# Patient Record
Sex: FEMALE | Race: WHITE | NOT HISPANIC OR LATINO | Employment: UNEMPLOYED | URBAN - METROPOLITAN AREA
[De-identification: names, ages, dates, MRNs, and addresses within clinical notes are randomized per-mention and may not be internally consistent; named-entity substitution may affect disease eponyms.]

---

## 2017-08-21 ENCOUNTER — ALLSCRIPTS OFFICE VISIT (OUTPATIENT)
Dept: OTHER | Facility: OTHER | Age: 37
End: 2017-08-21

## 2018-01-13 VITALS
RESPIRATION RATE: 20 BRPM | WEIGHT: 163.8 LBS | DIASTOLIC BLOOD PRESSURE: 66 MMHG | HEIGHT: 63 IN | HEART RATE: 94 BPM | TEMPERATURE: 98.9 F | SYSTOLIC BLOOD PRESSURE: 104 MMHG | BODY MASS INDEX: 29.02 KG/M2 | OXYGEN SATURATION: 98 %

## 2018-06-27 ENCOUNTER — APPOINTMENT (OUTPATIENT)
Dept: RADIOLOGY | Facility: CLINIC | Age: 38
End: 2018-06-27
Attending: FAMILY MEDICINE
Payer: COMMERCIAL

## 2018-06-27 ENCOUNTER — OFFICE VISIT (OUTPATIENT)
Dept: URGENT CARE | Facility: CLINIC | Age: 38
End: 2018-06-27
Payer: COMMERCIAL

## 2018-06-27 VITALS
HEART RATE: 92 BPM | OXYGEN SATURATION: 100 % | SYSTOLIC BLOOD PRESSURE: 126 MMHG | WEIGHT: 167 LBS | HEIGHT: 63 IN | DIASTOLIC BLOOD PRESSURE: 80 MMHG | RESPIRATION RATE: 16 BRPM | TEMPERATURE: 97.8 F | BODY MASS INDEX: 29.59 KG/M2

## 2018-06-27 DIAGNOSIS — M25.562 LEFT KNEE PAIN, UNSPECIFIED CHRONICITY: Primary | ICD-10-CM

## 2018-06-27 DIAGNOSIS — M25.562 LEFT KNEE PAIN, UNSPECIFIED CHRONICITY: ICD-10-CM

## 2018-06-27 PROCEDURE — 73564 X-RAY EXAM KNEE 4 OR MORE: CPT

## 2018-06-27 PROCEDURE — 99213 OFFICE O/P EST LOW 20 MIN: CPT | Performed by: FAMILY MEDICINE

## 2018-06-27 RX ORDER — ALBUTEROL SULFATE 90 UG/1
2 AEROSOL, METERED RESPIRATORY (INHALATION) EVERY 6 HOURS PRN
COMMUNITY

## 2018-06-27 NOTE — PROGRESS NOTES
[address]        NAME: Kristian Sahu is a 34 y o  female  : 1980    MRN: [id number]  DATE: 2018  TIME: 1:18 PM    Assessment and Plan   Left knee pain, unspecified chronicity [M25 562]  1  Left knee pain, unspecified chronicity  XR knee 4+ vw left injury    Ambulatory referral to Orthopedic Surgery         Patient Instructions     Patient Instructions   Left knee pain which may be related to a soft tissue injury  Xray shows no acute osseous abnormalities  We have applied an ace wrap for comfort and support  I have instructed the patient to rest the leg, keep it elevated, apply ice to the site, and take Tylenol or Motrin as needed for pain  I have provided a referral to orthopedics and instructed to follow up for further evaluation and treatment, including an MRI if needed for additional imaging  Follow up with PCP in 3-5 days  Proceed to  ER if symptoms worsen  Chief Complaint     Chief Complaint   Patient presents with    Knee Pain     Pt here for left knee x 1 month, pt states no injury for sure, she did twist it a few weeks ago  Pt states the area is puffy, 7/10  Pt used Advil which didn't help  History of Present Illness       35 yo female presents c/o L knee pain  She states she twisted her knee a few weeks ago and since then she has been experiencing intermittent pain and swelling of the left knee  She has pain w/ walking and going up and down the stairs  No falls or direct trauma to the knee  She denies any bruising, redness, or warmth  No fever/chills  No cuts/open wounds  No numbness/tingling or weakness of the leg  She has been applying ice to the knee and taking Advil as needed for pain  Review of Systems   Review of Systems   Constitutional: Negative  Respiratory: Negative  Cardiovascular: Negative  Musculoskeletal:        As noted in HPI   Skin: Negative  Neurological: Negative            Current Medications       Current Outpatient Prescriptions:     albuterol (PROVENTIL HFA,VENTOLIN HFA) 90 mcg/act inhaler, Inhale 2 puffs every 6 (six) hours as needed for wheezing, Disp: , Rfl:     Current Allergies     Allergies as of 2018 - Reviewed 2018   Allergen Reaction Noted    Other Rash 2018            The following portions of the patient's history were reviewed and updated as appropriate: allergies, current medications, past family history, past medical history, past social history, past surgical history and problem list      Past Medical History:   Diagnosis Date    Allergic     Arthritis     Asthma     Factor V Leiden ([address] )        Past Surgical History:   Procedure Laterality Date     SECTION      x2    DILATION AND CURETTAGE OF UTERUS      FOOT SURGERY      x2    HERNIA REPAIR      NASAL SEPTUM SURGERY      PLANTAR FASCIA SURGERY      x2       Family History   Problem Relation Age of Onset    Factor V Leiden deficiency Mother     Heart disease Father          Medications have been verified  Objective   /80 (BP Location: Right arm, Patient Position: Sitting, Cuff Size: Standard)   Pulse 92   Temp 97 8 °F (36 6 °C) (Tympanic)   Resp 16   Ht 5' 3" (1 6 m)   Wt 75 8 kg (167 lb)   SpO2 100%   BMI 29 58 kg/m²        Physical Exam     Physical Exam   Constitutional: She is oriented to person, place, and time  Vital signs are normal  She appears well-developed and well-nourished  She is active and cooperative  Non-toxic appearance  She does not have a sickly appearance  She does not appear ill  No distress  Musculoskeletal:   Left knee: mild generalized swelling, no erythema, or bruising  No excess warmth  Mild generalized tenderness to palpation  Knee w/ full ROM, c/o pain and difficulty w/ flexion  Negative Lachman's test  Normal gait  Neurological: She is alert and oriented to person, place, and time  Skin: Skin is warm, dry and intact  No bruising, no ecchymosis and no rash noted  She is not diaphoretic  No erythema  Psychiatric: She has a normal mood and affect  Her behavior is normal  Judgment and thought content normal    Nursing note and vitals reviewed

## 2018-06-27 NOTE — PATIENT INSTRUCTIONS
Left knee pain which may be related to a soft tissue injury  Xray shows no acute osseous abnormalities  We have applied an ace wrap for comfort and support  I have instructed the patient to rest the leg, keep it elevated, apply ice to the site, and take Tylenol or Motrin as needed for pain  I have provided a referral to orthopedics and instructed to follow up for further evaluation and treatment, including an MRI if needed for additional imaging

## 2018-06-28 ENCOUNTER — OFFICE VISIT (OUTPATIENT)
Dept: OBGYN CLINIC | Facility: CLINIC | Age: 38
End: 2018-06-28
Payer: COMMERCIAL

## 2018-06-28 VITALS
DIASTOLIC BLOOD PRESSURE: 82 MMHG | SYSTOLIC BLOOD PRESSURE: 124 MMHG | BODY MASS INDEX: 29.59 KG/M2 | HEIGHT: 63 IN | WEIGHT: 167 LBS | HEART RATE: 66 BPM

## 2018-06-28 DIAGNOSIS — M89.9 BONE LESION: ICD-10-CM

## 2018-06-28 DIAGNOSIS — M17.12 PRIMARY LOCALIZED OSTEOARTHRITIS OF LEFT KNEE: ICD-10-CM

## 2018-06-28 DIAGNOSIS — M25.562 LEFT KNEE PAIN, UNSPECIFIED CHRONICITY: Primary | ICD-10-CM

## 2018-06-28 PROCEDURE — 99204 OFFICE O/P NEW MOD 45 MIN: CPT | Performed by: ORTHOPAEDIC SURGERY

## 2018-06-28 RX ORDER — FLUTICASONE PROPIONATE 50 MCG
1 SPRAY, SUSPENSION (ML) NASAL DAILY
COMMUNITY

## 2018-06-28 RX ORDER — MONTELUKAST SODIUM 10 MG/1
10 TABLET ORAL
COMMUNITY

## 2018-06-28 NOTE — PROGRESS NOTES
Assessment/Plan:  1  Left knee pain, unspecified chronicity  MRI knee left  wo contrast   2  Primary localized osteoarthritis of left knee     3  Bone lesion  MRI knee left  wo contrast     Patient is a very pleasant 64-year-old female that presents today for ongoing and progressive left knee pain since May of 2018  After thorough history, clinical exam and review of her x-rays I feel that there is a component of localized primary osteoarthritis within her patellofemoral joint that can be seen on x-ray as well as clinically to be a significant cause for her pain and discomfort over the anterior knee  However in addition to this there does appear to be some cystic changes in areas of lucency in the medial side of the patella on the sunrise view which are associated with extra-articular soft tissue swelling in this area with increased pain  The patient denies a history of trauma therefore I feel that these need to be evaluated further to rule out the possibility of being an underlying lytic/pathologic lesion in her patella  These could also be projectional distortion verses underlying cyst from early osteoarthritis however I would not expect the extra-articular symptoms and clinical exam findings from these etiologies  We discussed this here today  She was given a lateral J brace and advised to continue her over-the-counter pain management protocol with NSAIDs and Tylenol and to avoid activities that exacerbate the patellofemoral joint such as kneeling stooping or squatting  I have asked her to get an MRI of her left knee and return to the office for its review here with me  We will further delineate the plan of care after the MRI is reviewed  The patient may call the office at anytime if any questions or concerns should arise  Subjective:  Left knee pain    Patient ID: Rosanne Carr is a 34 y o  female      HPI  Patient is a very pleasant 64-year-old female that presents today for ongoing anterior left knee pain   She states that she has had ongoing anterior left knee pain since May  She does report a twisting event months ago but she does not report a significant injury from this  She states that over the last 2-3 weeks her pain has becoming progressively worse over the anterior portion of her knee  She states the pain is localized anteriorly and can reach 8/10 on the pain scale  She describes the pain as a heavy fullness and ache with associated swelling just above her patella  She states that she can feel crackling in the anterior portion of her knee while going up and down steps  Ambulation, weight-bearing, and going up and down stairs exacerbate her anterior knee pain  She states there is swelling above her kneecap that gets worse by the end of the day  She denies any prior surgery to this knee  She does state that she has been taking anti-inflammatories and Tylenol to help control pain however this is not providing significant benefit for her  Review of Systems   Constitutional: Positive for activity change  HENT: Negative  Eyes: Negative  Respiratory: Negative  Cardiovascular: Negative  Gastrointestinal: Negative  Endocrine: Negative  Musculoskeletal: Positive for arthralgias  Skin: Negative  Neurological: Negative  Psychiatric/Behavioral: Negative  Past Medical History:   Diagnosis Date    Allergic     Arthritis     Asthma     [facility] V Le[address] [address]        Past Surgical History:   Procedure Laterality Date     SECTION      x2    DILATION AND CURETTAGE OF UTERUS      FOOT SURGERY      x2    HERNIA REPAIR      NASAL SEPTUM SURGERY      PLANTAR FASCIA SURGERY      x2       Family History   Problem Relation Age of Onset    Factor V Leiden deficiency Mother     Heart disease Father        Social History     Occupational History    Not on file       Social History Main Topics    Smoking status: Never Smoker    Smokeless tobacco: Never Used   Vadim Cabrera Alcohol use Yes      Comment: social    Drug use: No    Sexual activity: Not on file         Current Outpatient Prescriptions:     fluticasone (FLONASE) 50 mcg/act nasal spray, 1 spray into each nostril daily, Disp: , Rfl:     montelukast (SINGULAIR) 10 mg tablet, Take 10 mg by mouth daily at bedtime, Disp: , Rfl:     albuterol (PROVENTIL HFA,VENTOLIN HFA) 90 mcg/act inhaler, Inhale 2 puffs every 6 (six) hours as needed for wheezing, Disp: , Rfl:     Allergies   Allergen Reactions    Other Rash     Cats, mushrooms, grasses, dust    Pt states itchy throat, watery  eyes, sneezing,        Objective:  Vitals:    06/28/18 1002   BP: 124/82   Pulse: 66       Body mass index is 29 58 kg/m²  Right Knee Exam     Other   Other tests: no effusion present      Left Knee Exam     Tenderness   The patient is experiencing tenderness in the patella (Tenderness palpation diffusely around patella but more exquisitely over the medial facet and lateral facet  )  Range of Motion   Extension: 0   Flexion: 130     Tests   Koko:  Medial - negative Lateral - negative  Drawer:       Anterior - negative     Posterior - negative  Varus: negative  Valgus: negative  Patellar Apprehension: negative    Other   Erythema: absent  Scars: absent  Sensation: normal  Pulse: present  Swelling: mild  Effusion: no effusion present    Comments:  Crepitance on AROM and PROM-parapatellar  +PFG  No increased warmth of knee  Knee is stable at 0, 30, 90 degrees  There is what appears to be a subcutaneous fullness and swelling just superior to the medial lateral portions of the patella that does not appear to be intra-articular fusion  There is no redness in this area  There is no skin compromise this area  No discrete mass is appreciated in this area  However it is noticeably different compared to the contralateral side  Observations   Left Knee   Negative for effusion  Right Knee   Negative for effusion         Physical Exam Constitutional: She is oriented to person, place, and time  She appears well-developed  HENT:   Head: Atraumatic  Eyes: EOM are normal    Neck: Neck supple  Cardiovascular: Normal rate  Pulmonary/Chest: Effort normal    Musculoskeletal:        Right knee: She exhibits no effusion  Left knee: She exhibits no effusion  See orthopedic exam   Neurological: She is alert and oriented to person, place, and time  Skin: Skin is warm and dry  Psychiatric: She has a normal mood and affect  Nursing note and vitals reviewed  I have personally reviewed pertinent films in PACS  X-rays from 6/27/2018 reviewed by me  No acute osseous injury or blastic lesion  There is no excessive degenerative change in the tibial femoral joints and joint spaces are well maintained  There is mild joint space asymmetry within the patellofemoral joint favoring the lateral facet  There is mild increased sclerosis here as well  In the medial aspect of the patella there is what appears to be well corticated cysts in the patella however there is a area of lucency that is superior to the medial facet of the patella that is atypical for this location  Kayla YOUNGER    Division of Adult Reconstruction  Department of [facility]

## 2018-07-19 ENCOUNTER — OFFICE VISIT (OUTPATIENT)
Dept: OBGYN CLINIC | Facility: CLINIC | Age: 38
End: 2018-07-19
Payer: COMMERCIAL

## 2018-07-19 VITALS
HEIGHT: 63 IN | BODY MASS INDEX: 29.77 KG/M2 | WEIGHT: 168 LBS | DIASTOLIC BLOOD PRESSURE: 82 MMHG | SYSTOLIC BLOOD PRESSURE: 122 MMHG | HEART RATE: 96 BPM

## 2018-07-19 DIAGNOSIS — M22.2X2 PATELLOFEMORAL SYNDROME OF LEFT KNEE: Primary | ICD-10-CM

## 2018-07-19 DIAGNOSIS — M17.12 PRIMARY LOCALIZED OSTEOARTHRITIS OF LEFT KNEE: ICD-10-CM

## 2018-07-19 DIAGNOSIS — M25.562 CHRONIC PAIN OF LEFT KNEE: ICD-10-CM

## 2018-07-19 DIAGNOSIS — G89.29 CHRONIC PAIN OF LEFT KNEE: ICD-10-CM

## 2018-07-19 PROCEDURE — 99214 OFFICE O/P EST MOD 30 MIN: CPT | Performed by: ORTHOPAEDIC SURGERY

## 2018-07-19 NOTE — PROGRESS NOTES
Assessment/Plan:  1  Patellofemoral syndrome of left knee  Ambulatory referral to Physical Therapy   2  Primary localized osteoarthritis of left knee  Ambulatory referral to Physical Therapy   3  Chronic pain of left knee       Drew Tim is a pleasant 34year old female with left knee pain consistent with her significant underlying patellofemoral syndrome and osteoarthritis  There is no concerning lesion on the patella on the MRI, but rather a well demarcated subchondral cyst consistent with arthritis  We have referred her to formal physical therapy for strengthening of her VMO, hips, and core to help improve patellofemoral tracking and gait mechanics  She may continue with the brace and anti-inflammatories as needed  We would like to see her back in two months for clinical reevaluation  All of her questions were addressed today  Subjective: Left knee MRI follow up    Patient ID: Dorita Riley is a 34 y o  female  Drew Tim is a pleasant 34year old female presenting for follow up after undergoing an MRI of her left knee  She actually has seen an improvement in symptoms  While snorkeling, she felt a pop in her knee, and since has not has as much pain  She has been wearing the knee brace  She denies any mechanical symptoms or parasthesias  Review of Systems   Constitutional: Negative  HENT: Negative  Eyes: Negative  Respiratory: Positive for cough (allergy)  Cardiovascular: Negative  Gastrointestinal: Negative  Endocrine: Negative  Genitourinary: Negative  Musculoskeletal: Positive for joint swelling and myalgias  Skin: Negative  Allergic/Immunologic: Negative  Neurological: Positive for headaches (sinus)  Hematological: Negative  Psychiatric/Behavioral: Negative            Past Medical History:   Diagnosis Date    Allergic     Arthritis     Asthma     Factor V Leiden [facility])        Past Surgical History:   Procedure Laterality Date     SECTION      x2    DILATION AND CURETTAGE OF UTERUS      FOOT SURGERY      x2    HERNIA REPAIR      NASAL SEPTUM SURGERY      PLANTAR FASCIA SURGERY      x2       Family History   Problem Relation Age of Onset    Factor V Leiden deficiency Mother     Heart disease Father        Social History     Occupational History    Not on file  Social History Main Topics    Smoking status: Never Smoker    Smokeless tobacco: Never Used    Alcohol use Yes      Comment: social    Drug use: No    Sexual activity: Not on file         Current Outpatient Prescriptions:     albuterol (PROVENTIL HFA,VENTOLIN HFA) 90 mcg/act inhaler, Inhale 2 puffs every 6 (six) hours as needed for wheezing, Disp: , Rfl:     fluticasone (FLONASE) 50 mcg/act nasal spray, 1 spray into each nostril daily, Disp: , Rfl:     montelukast (SINGULAIR) 10 mg tablet, Take 10 mg by mouth daily at bedtime, Disp: , Rfl:     Allergies   Allergen Reactions    Other Rash     Cats, mushrooms, grasses, dust    Pt states itchy throat, watery  eyes, sneezing,     Cat Hair Extract     Dust Mite Extract     Pollen Extract        Objective:  Vitals:    07/19/18 0838   BP: 122/82   Pulse: 96       Body mass index is 29 76 kg/m²  Left Knee Exam     Tenderness   The patient is experiencing tenderness in the patella (Tenderness palpation diffusely around patella but more exquisitely over the medial facet and lateral facet  )      Range of Motion   Extension: 0   Flexion: 130     Tests   Koko:  Medial - negative Lateral - negative  Drawer:       Anterior - negative     Posterior - negative  Varus: negative  Valgus: negative  Patellar Apprehension: negative    Other   Erythema: absent  Scars: absent  Sensation: normal  Pulse: present  Swelling: mild  Effusion: no effusion present    Comments:  Crepitance on AROM and PROM-parapatellar  +PFG  Collateral ligaments stable at 0, 30, 90 degrees  Ambulates with a normal, symmetrical gait          Observations   Left Knee   Negative for effusion  Physical Exam   Constitutional: She is oriented to person, place, and time  She appears well-developed and well-nourished  Body mass index is 29 76 kg/m²  HENT:   Head: Normocephalic and atraumatic  Eyes: EOM are normal    Neck: Normal range of motion  Cardiovascular: Intact distal pulses  Pulmonary/Chest: Effort normal    Musculoskeletal:        Left knee: She exhibits no effusion  See ortho exam   Neurological: She is alert and oriented to person, place, and time  Skin: Skin is warm and dry  Psychiatric: She has a normal mood and affect  Her behavior is normal  Judgment and thought content normal        I have personally reviewed pertinent films in PACS of the MRI taken of her left knee which does not show any tearing of the menisci, collateral, or cruciate ligaments  There is a 2cm chondral fissure of the medial patella, and a benign appearing bony cyst in the patella  There is chondromalacia of the patella and trochlea

## 2018-08-23 ENCOUNTER — EVALUATION (OUTPATIENT)
Dept: PHYSICAL THERAPY | Facility: CLINIC | Age: 38
End: 2018-08-23
Payer: COMMERCIAL

## 2018-08-23 DIAGNOSIS — M25.562 CHRONIC PAIN OF LEFT KNEE: ICD-10-CM

## 2018-08-23 DIAGNOSIS — G89.29 CHRONIC PAIN OF LEFT KNEE: ICD-10-CM

## 2018-08-23 DIAGNOSIS — M22.2X2 PATELLOFEMORAL SYNDROME OF LEFT KNEE: Primary | ICD-10-CM

## 2018-08-23 DIAGNOSIS — M17.12 PRIMARY LOCALIZED OSTEOARTHRITIS OF LEFT KNEE: ICD-10-CM

## 2018-08-23 PROCEDURE — G8978 MOBILITY CURRENT STATUS: HCPCS

## 2018-08-23 PROCEDURE — G8979 MOBILITY GOAL STATUS: HCPCS

## 2018-08-23 PROCEDURE — 97162 PT EVAL MOD COMPLEX 30 MIN: CPT
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
## 2018-08-28 ENCOUNTER — APPOINTMENT (OUTPATIENT)
Dept: PHYSICAL THERAPY | Facility: CLINIC | Age: 38
End: 2018-08-28
Payer: COMMERCIAL

## 2018-08-30 ENCOUNTER — OFFICE VISIT (OUTPATIENT)
Dept: PHYSICAL THERAPY | Facility: CLINIC | Age: 38
End: 2018-08-30
Payer: COMMERCIAL

## 2018-08-30 DIAGNOSIS — G89.29 CHRONIC PAIN OF LEFT KNEE: ICD-10-CM

## 2018-08-30 DIAGNOSIS — M17.12 PRIMARY LOCALIZED OSTEOARTHRITIS OF LEFT KNEE: ICD-10-CM

## 2018-08-30 DIAGNOSIS — M22.2X2 PATELLOFEMORAL SYNDROME OF LEFT KNEE: Primary | ICD-10-CM

## 2018-08-30 DIAGNOSIS — M25.562 CHRONIC PAIN OF LEFT KNEE: ICD-10-CM

## 2018-08-30 PROCEDURE — 97110 THERAPEUTIC EXERCISES: CPT

## 2018-08-30 PROCEDURE — 97140 MANUAL THERAPY 1/> REGIONS: CPT

## 2018-08-30 NOTE — PROGRESS NOTES
Daily Note     Today's date: 2018  Patient name: Anshul Teran  : 1980  MRN: [id number]  Referring provider: Jayna Fernandez  Dx:   Encounter Diagnosis     ICD-10-CM    1  Patellofemoral syndrome of left knee M22 2X2    2  Primary localized osteoarthritis of left knee M17 12    3  Chronic pain of left knee M25 562     G89 29                   Subjective: Patient reports that her knee is really bothering her today  She just got back from driving from [place] a few minutes before coming to session  She does report getting out and walking around every so often, but it still really bothers her  Objective: See treatment diary below  Daily Treatment Diary     Manual  18           STM  L knee ITB, peripatellar retrograde STM,  K-tape for edema control L knee           Flexibility  L LE flossing 10x           Joint Mobs  PF medial and inferior mobs Gr IV                                         Exercise Diary  18           Bridges 5"x15 5" hold x15           Bike Add x10  Min upright level 1           SAQ  2x10 5" negatives           Prostretch  30"x4 L                                                                                                                                                                                                                                 Assessment: Tolerated treatment well and without distress  She demonstrates significant TTP to L ITB and lateral border of patella, addressed with STM  She experienced pain while riding the upright bike, but reported down to 5/10 by conclusion of session  Encouraged her to perform step stretch, hamstring stretch and LE gliding over the weekend  Patient would benefit from continued PT to decrease L knee pain, increase STM and improve performance of functional movement patterns as tolerated  Plan: Continue per plan of care

## 2018-09-04 ENCOUNTER — OFFICE VISIT (OUTPATIENT)
Dept: PHYSICAL THERAPY | Facility: CLINIC | Age: 38
End: 2018-09-04
Payer: COMMERCIAL

## 2018-09-04 DIAGNOSIS — M25.562 CHRONIC PAIN OF LEFT KNEE: ICD-10-CM

## 2018-09-04 DIAGNOSIS — G89.29 CHRONIC PAIN OF LEFT KNEE: ICD-10-CM

## 2018-09-04 DIAGNOSIS — M22.2X2 PATELLOFEMORAL SYNDROME OF LEFT KNEE: Primary | ICD-10-CM

## 2018-09-04 DIAGNOSIS — M17.12 PRIMARY LOCALIZED OSTEOARTHRITIS OF LEFT KNEE: ICD-10-CM

## 2018-09-04 PROCEDURE — 97140 MANUAL THERAPY 1/> REGIONS: CPT

## 2018-09-04 PROCEDURE — 97110 THERAPEUTIC EXERCISES: CPT

## 2018-09-04 NOTE — PROGRESS NOTES
Daily Note     Today's date: 2018  Patient name: Dorota Perdomo  : 1980  MRN: [id number]  Referring provider: Deyanira Black  Dx:   Encounter Diagnosis     ICD-10-CM    1  Patellofemoral syndrome of left knee M22 2X2    2  Primary localized osteoarthritis of left knee M17 12    3  Chronic pain of left knee M25 562     G89 29                   Subjective: Patient reports that she had a lot of relief with use of the tape from last session  She did a lot more this past weekend that she previously couldn't do, but it started to bring the pain back a little more yesterday  Today on arrival, pain 5/10  Objective: See treatment diary below  Daily Treatment Diary     Manual  18          STM  L knee ITB, peripatellar retrograde STM,  K-tape for edema control L knee L knee ITB, peripatellar retrograde STM, IASTM L ITB and vastus lateralis          Flexibility  L LE flossing 10x L LE flossing 10x          Joint Mobs  PF medial and inferior mobs Gr IV PF medial and inferior mobs Gr IV          Kinesiotaping  Edema Control Edema control                           Exercise Diary  18          Bridges 5"x15 5" hold x15 5" hold x20          Bike Add x10  Min upright level 1 x10  Min upright level 1          SAQ  2x10 5" negatives 2x10 5" negatives          Prostretch  30"x4 L NT                                                                                                                                                                                                                                Assessment: Tolerated treatment well and without distress  She demo's increased TTP to ITB in addition to increased tone of vastus lateralis, addressed with IASTM with mild redness response achieved  Reapplied K-tape to assist with edema control again to decrease her pain levels  Educated her on activity modification and avoiding over straining the irritated tissues  She verbalizes understanding  Patient exhibited good technique with therapeutic exercises and would benefit from continued PT  In this setting to decrease L knee pain and improve hip strength B ,     Plan: Continue per plan of care

## 2018-09-06 ENCOUNTER — OFFICE VISIT (OUTPATIENT)
Dept: PHYSICAL THERAPY | Facility: CLINIC | Age: 38
End: 2018-09-06
Payer: COMMERCIAL

## 2018-09-06 DIAGNOSIS — G89.29 CHRONIC PAIN OF LEFT KNEE: ICD-10-CM

## 2018-09-06 DIAGNOSIS — M22.2X2 PATELLOFEMORAL SYNDROME OF LEFT KNEE: Primary | ICD-10-CM

## 2018-09-06 DIAGNOSIS — M17.12 PRIMARY LOCALIZED OSTEOARTHRITIS OF LEFT KNEE: ICD-10-CM

## 2018-09-06 DIAGNOSIS — M25.562 CHRONIC PAIN OF LEFT KNEE: ICD-10-CM

## 2018-09-06 PROCEDURE — 97140 MANUAL THERAPY 1/> REGIONS: CPT

## 2018-09-06 PROCEDURE — 97112 NEUROMUSCULAR REEDUCATION: CPT

## 2018-09-06 NOTE — PROGRESS NOTES
Daily Note     Today's date: 2018  Patient name: Peggy Zelaya  : 1980  MRN: [id number]  Referring provider: Jonnie Duarte  Dx:   Encounter Diagnosis     ICD-10-CM    1  Patellofemoral syndrome of left knee M22 2X2    2  Primary localized osteoarthritis of left knee M17 12    3  Chronic pain of left knee M25 562     G89 29                   Subjective: Patient reports that she was walking down her stairs about 45 minutes prior to coming to PT today and felt a large pop in her L knee in addition to sharp pain down the L leg  She has also been having tingling and "pins and needles" pain down the L leg intermittently        Objective: See treatment diary below  Daily Treatment Diary     Manual  18         STM  L knee ITB, peripatellar retrograde STM,  K-tape for edema control L knee L knee ITB, peripatellar retrograde STM, IASTM L ITB and vastus lateralis L knee ITB, peripatellar retrograde, L piriformis release, L recuts femoris hold-relax release         Flexibility  L LE flossing 10x L LE flossing 10x L LE flossing 10x         Joint Mobs  PF medial and inferior mobs Gr IV PF medial and inferior mobs Gr IV PF medial and inferior mobs Gr IV, MET and correction of L pelvic upslip         Kinesiotaping  Edema Control Edema control Curent tape intact                          Exercise Diary  18         Bridges 5"x15 5" hold x15 5" hold x20 5" hold x20         Bike Add x10  Min upright level 1 x10  Min upright level 1 NT         SAQ  2x10 5" negatives 2x10 5" negatives NT         Prostretch  30"x4 L NT NT         Ball squeezes with TA    5"x30         Supine Clamshell iso with TA    5"X30 with Red TB                                                                                                                                                                                                     Assessment: Tolerated treatment well though her current signs and symptoms appear to be of lumbo-pelvic origin and L LE radiculopathy  She demonstrated an upslip on the L innominate, addressed with manual techniques and correction  She also reports rolling her L ankle about 3 weeks ago that is still getting a small area of swelling and is noted to be tender to palpation at L ATFL and CF ligament  Advised her to seek MD consult further for spine and for her L ankle if symptoms persist or worsen  She reported significant reduction in her pain levels by conclusion of session  Advised her to focus on core stability exercises over the weekend and avoiding loaded asymetrical movements or activities to allow the nerve symptoms to calm down  She verbalizes understanding and red TB was provided for her HEP  Patient would benefit from continued PT in this setting in addition to further MD consult if nerve symptoms persist        Plan: Continue per plan of care

## 2018-09-11 ENCOUNTER — OFFICE VISIT (OUTPATIENT)
Dept: PHYSICAL THERAPY | Facility: CLINIC | Age: 38
End: 2018-09-11
Payer: COMMERCIAL

## 2018-09-11 DIAGNOSIS — M17.12 PRIMARY LOCALIZED OSTEOARTHRITIS OF LEFT KNEE: ICD-10-CM

## 2018-09-11 DIAGNOSIS — M25.562 CHRONIC PAIN OF LEFT KNEE: ICD-10-CM

## 2018-09-11 DIAGNOSIS — M22.2X2 PATELLOFEMORAL SYNDROME OF LEFT KNEE: Primary | ICD-10-CM

## 2018-09-11 DIAGNOSIS — G89.29 CHRONIC PAIN OF LEFT KNEE: ICD-10-CM

## 2018-09-11 PROCEDURE — 97140 MANUAL THERAPY 1/> REGIONS: CPT

## 2018-09-11 PROCEDURE — 97110 THERAPEUTIC EXERCISES: CPT

## 2018-09-11 NOTE — PROGRESS NOTES
Daily Note     Today's date: 2018  Patient name: Dorota Perdomo  : 1980  MRN: [id number]  Referring provider: Deyanira Black  Dx:   Encounter Diagnosis     ICD-10-CM    1  Patellofemoral syndrome of left knee M22 2X2    2  Primary localized osteoarthritis of left knee M17 12    3  Chronic pain of left knee M25 562     G89 29                   Subjective: Patient reports that her pain has not been sharp down the L LE since last session  She was able to decrease her activity level over the weekend         Objective: See treatment diary below  Daily Treatment Diary     Manual  18        STM  L knee ITB, peripatellar retrograde STM,  K-tape for edema control L knee L knee ITB, peripatellar retrograde STM, IASTM L ITB and vastus lateralis L knee ITB, peripatellar retrograde, L piriformis release, L recuts femoris hold-relax release L knee ITB, peripatellar retrograde, L piriformis release, L recuts femoris hold-relax release        Flexibility  L LE flossing 10x L LE flossing 10x L LE flossing 10x L LE flossing 10x        Joint Mobs  PF medial and inferior mobs Gr IV PF medial and inferior mobs Gr IV PF medial and inferior mobs Gr IV, MET and correction of L pelvic upslip PF medial and inferior mobs Gr IV, MET and correction of L anterior innominate        Kinesiotaping  Edema Control Edema control Curent tape intact                          Exercise Diary  18        Bridges 5"x15 5" hold x15 5" hold x20 5" hold x20 5" hold x20        Bike Add x10  Min upright level 1 x10  Min upright level 1 NT x10  Min upright level 1        SAQ  2x10 5" negatives 2x10 5" negatives NT NT        Prostretch  30"x4 L NT NT 30"x4 B        Ball squeezes with TA    5"x30 5"x30        Supine Clamshell iso with TA    5"X30 with Red TB 5"X30 with Red TB        Prone Press up     To Elbows        Prone Quad SOS      Towel roll under L thigh 10"x10 Assessment: Tolerated treatment well and without distress  She presented initially today with paresthesias down the posterior aspect of the L thigh into lateral hamstring area  Addressed with LE nerve glides and prone press ups with manual overpressure  She reported no nerve symptoms by conclusion of session  She demonstrates decreased core strength and lumbopelvic stability, addressed with lumbar mobilizations and neutral spine core activation exercises  Patient exhibited good technique with therapeutic exercises and would benefit from continued PT in this setting to decrease pain in L knee and improve functional mobility  Consider adding hip hinge exercises next session  Plan: Continue per plan of care

## 2018-09-13 ENCOUNTER — OFFICE VISIT (OUTPATIENT)
Dept: PHYSICAL THERAPY | Facility: CLINIC | Age: 38
End: 2018-09-13
Payer: COMMERCIAL

## 2018-09-13 DIAGNOSIS — G89.29 CHRONIC PAIN OF LEFT KNEE: ICD-10-CM

## 2018-09-13 DIAGNOSIS — M25.562 CHRONIC PAIN OF LEFT KNEE: ICD-10-CM

## 2018-09-13 DIAGNOSIS — M17.12 PRIMARY LOCALIZED OSTEOARTHRITIS OF LEFT KNEE: ICD-10-CM

## 2018-09-13 DIAGNOSIS — M22.2X2 PATELLOFEMORAL SYNDROME OF LEFT KNEE: Primary | ICD-10-CM

## 2018-09-13 PROCEDURE — 97110 THERAPEUTIC EXERCISES: CPT

## 2018-09-13 PROCEDURE — 97112 NEUROMUSCULAR REEDUCATION: CPT

## 2018-09-13 NOTE — PROGRESS NOTES
Daily Note     Today's date: 2018  Patient name: Naomy Irby  : 1980  MRN: [id number]  Referring provider: Lexi Pa  Dx:   Encounter Diagnosis     ICD-10-CM    1  Patellofemoral syndrome of left knee M22 2X2    2  Primary localized osteoarthritis of left knee M17 12    3  Chronic pain of left knee M25 562     G89 29                   Subjective: Patient reports that she has not had the sharp pains down the leg since last session and the lower back exercises seem to have helped decrease the tingling and numbness she was also having  She continues to have pain around the L patella, but down to a 4/10 on arrival to session today         Objective: See treatment diary below  Daily Treatment Diary     Manual  18       STM  L knee ITB, peripatellar retrograde STM,  K-tape for edema control L knee L knee ITB, peripatellar retrograde STM, IASTM L ITB and vastus lateralis L knee ITB, peripatellar retrograde, L piriformis release, L recuts femoris hold-relax release L knee ITB, peripatellar retrograde, L piriformis release, L recuts femoris hold-relax release L knee ITB, peripatellar retrograde, L piriformis release, L recuts femoris hold-relax release       Flexibility  L LE flossing 10x L LE flossing 10x L LE flossing 10x L LE flossing 10x L LE flossing 10x       Joint Mobs  PF medial and inferior mobs Gr IV PF medial and inferior mobs Gr IV PF medial and inferior mobs Gr IV, MET and correction of L pelvic upslip PF medial and inferior mobs Gr IV, MET and correction of L anterior innominate PF medial and inferior mobs Gr IV, MET and correction of L anterior innominate       Kinesiotaping  Edema Control Edema control Curent tape intact                          Exercise Diary  18       Bridges 5"x15 5" hold x15 5" hold x20 5" hold x20 5" hold x20 5" hold x20       Bike Add x10  Min upright level 1 x10  Min upright level 1 NT x10  Min upright level 1 x10  Min upright level 1       SAQ  2x10 5" negatives 2x10 5" negatives NT NT 2x10 5" negatives       Prostretch  30"x4 L NT NT 30"x4 B 30"x4 B       Ball squeezes with TA    5"x30 5"x30 5"x30       Supine Clamshell iso with TA    5"X30 with Red TB 5"X30 with Red TB 5"X30 with Red TB       Prone Press up     To Elbows 1x15, 5" holds       Prone Quad SOS      Towel roll under L thigh 10"x10 Towel roll under L thigh 10"x10                                                                                                                                                                         Assessment: Tolerated treatment well and without distress  She continues to report centralization of any peripheral symptoms with performance of prone press ups  Encouraged her to perform this as often as she has symptoms in the L LE  Patient demonstrated fatigue post treatment, exhibited good technique with therapeutic exercises and would benefit from continued PT in this setting to increase LE strength and core strength/ endurance  Plan: Continue per plan of care

## 2018-09-18 ENCOUNTER — APPOINTMENT (OUTPATIENT)
Dept: PHYSICAL THERAPY | Facility: CLINIC | Age: 38
End: 2018-09-18
Payer: COMMERCIAL

## 2018-09-20 ENCOUNTER — APPOINTMENT (OUTPATIENT)
Dept: PHYSICAL THERAPY | Facility: CLINIC | Age: 38
End: 2018-09-20
Payer: COMMERCIAL

## 2018-09-25 ENCOUNTER — OFFICE VISIT (OUTPATIENT)
Dept: PHYSICAL THERAPY | Facility: CLINIC | Age: 38
End: 2018-09-25
Payer: COMMERCIAL

## 2018-09-25 DIAGNOSIS — M22.2X2 PATELLOFEMORAL SYNDROME OF LEFT KNEE: Primary | ICD-10-CM

## 2018-09-25 DIAGNOSIS — M25.562 CHRONIC PAIN OF LEFT KNEE: ICD-10-CM

## 2018-09-25 DIAGNOSIS — M17.12 PRIMARY LOCALIZED OSTEOARTHRITIS OF LEFT KNEE: ICD-10-CM

## 2018-09-25 DIAGNOSIS — G89.29 CHRONIC PAIN OF LEFT KNEE: ICD-10-CM

## 2018-09-25 PROCEDURE — 97110 THERAPEUTIC EXERCISES: CPT

## 2018-09-25 PROCEDURE — 97140 MANUAL THERAPY 1/> REGIONS: CPT

## 2018-09-25 NOTE — PROGRESS NOTES
Daily Note     Today's date: 2018  Patient name: Rosanne Carr  : 1980  MRN: [id number]  Referring provider: Analia Mcdermott  Dx:   Encounter Diagnosis     ICD-10-CM    1  Patellofemoral syndrome of left knee M22 2X2    2  Primary localized osteoarthritis of left knee M17 12    3  Chronic pain of left knee M25 562     G89 29                   Subjective: Patient reports that she was ill last week with a chest cold, which affects her asthma  She took the week off from any strenuous activity which has made her knee pain better    Pain today 3 5/10 on arrival        Objective: See treatment diary below  Daily Treatment Diary     Manual  18      STM  L knee ITB, peripatellar retrograde STM,  K-tape for edema control L knee L knee ITB, peripatellar retrograde STM, IASTM L ITB and vastus lateralis L knee ITB, peripatellar retrograde, L piriformis release, L recuts femoris hold-relax release L knee ITB, peripatellar retrograde, L piriformis release, L recuts femoris hold-relax release L knee ITB, peripatellar retrograde, L piriformis release, L recuts femoris hold-relax release L knee ITB, peripatellar retrograde, L piriformis release, L recuts femoris hold-relax release      Flexibility  L LE flossing 10x L LE flossing 10x L LE flossing 10x L LE flossing 10x L LE flossing 10x L LE flossing 10x      Joint Mobs  PF medial and inferior mobs Gr IV PF medial and inferior mobs Gr IV PF medial and inferior mobs Gr IV, MET and correction of L pelvic upslip PF medial and inferior mobs Gr IV, MET and correction of L anterior innominate PF medial and inferior mobs Gr IV, MET and correction of L anterior innominate PF medial and inferior mobs Gr IV, MET and correction of L anterior innominate      Kinesiotaping  Edema Control Edema control Curent tape intact                          Exercise Diary  18 Bridges 5"x15 5" hold x15 5" hold x20 5" hold x20 5" hold x20 5" hold x20 5" hold x20      Bike Add x10  Min upright level 1 x10  Min upright level 1 NT x10  Min upright level 1 x10  Min upright level 1 x10  Min upright level 1      SAQ  2x10 5" negatives 2x10 5" negatives NT NT 2x10 5" negatives NT      Prostretch  30"x4 L NT NT 30"x4 B 30"x4 B 30"x4 B      Ball squeezes with TA    5"x30 5"x30 5"x30 5"x30      Supine Clamshell iso with TA    5"X30 with Red TB 5"X30 with Red TB 5"X30 with Red TB 5"X30 with Red TB      Prone Press up     To Elbows 1x15, 5" holds 1x15, 5" holds      Prone Quad SOS      Towel roll under L thigh 10"x10 Towel roll under L thigh 10"x10                                                                                                                                                                         Assessment: Tolerated treatment well and without distress but continues to have TTP to lateral patella  She also gets intermittent paresthesias in L lower leg, but centralizes with lumbar extension movements  Patient demonstrated fatigue post treatment, exhibited good technique with therapeutic exercises and would benefit from continued PT in this setting, will progress TE's as able  Plan: Continue per plan of care

## 2018-09-27 ENCOUNTER — OFFICE VISIT (OUTPATIENT)
Dept: PHYSICAL THERAPY | Facility: CLINIC | Age: 38
End: 2018-09-27
Payer: COMMERCIAL

## 2018-09-27 DIAGNOSIS — M22.2X2 PATELLOFEMORAL SYNDROME OF LEFT KNEE: Primary | ICD-10-CM

## 2018-09-27 DIAGNOSIS — M17.12 PRIMARY LOCALIZED OSTEOARTHRITIS OF LEFT KNEE: ICD-10-CM

## 2018-09-27 DIAGNOSIS — G89.29 CHRONIC PAIN OF LEFT KNEE: ICD-10-CM

## 2018-09-27 DIAGNOSIS — M25.562 CHRONIC PAIN OF LEFT KNEE: ICD-10-CM

## 2018-09-27 PROCEDURE — G8978 MOBILITY CURRENT STATUS: HCPCS

## 2018-09-27 PROCEDURE — G8979 MOBILITY GOAL STATUS: HCPCS

## 2018-09-27 PROCEDURE — 97140 MANUAL THERAPY 1/> REGIONS: CPT

## 2018-09-27 PROCEDURE — 97110 THERAPEUTIC EXERCISES: CPT

## 2018-09-27 NOTE — PROGRESS NOTES
PT Re-Evaluation     Today's date: 2018  Patient name: Ector Campos  : 1980  MRN: [id number]  Referring provider: Elsa Boyer  Dx:   Encounter Diagnosis     ICD-10-CM    1  Patellofemoral syndrome of left knee M22 2X2    2  Primary localized osteoarthritis of left knee M17 12    3  Chronic pain of left knee M25 562     G89 29                   Assessment  Impairments: abnormal gait, abnormal muscle tone, abnormal or restricted ROM, activity intolerance, impaired balance, impaired physical strength, lacks appropriate home exercise program, pain with function, poor posture  and poor body mechanics  Functional limitations: Sleep disturbances, unable to tolerate reciprocaly stair negotiation consistently without increasing swelling L knee and increasing pain  Assessment details: Ector Campos presents with signs and symptoms consistent with referring diagnosis of L knee PFPS and OA and has attended 8 sessions of PT since her evaluation on 18  She demonstrates pain, decreased strength, decreased ROM, decreased joint mobility of L LE, poor core strength and control, ambulatory dysfunction and poor posture during performance of functional movement patterns, which all contribute to impairing her current functional mobility  Recommend that she may benefit from continuing PT in this setting in order to address the aforementioned impairments and restore Her ability to perform all prior activities without pain or difficulty  Should you have any questions regarding this patient's care, please contact [phone]  Thank you for your referral      Understanding of Dx/Px/POC: good   Prognosis: good    Goals  Short Term Goals to be completed within 3 weeks  Patient to decrease pain at rest to 1/10 pain with activity to 6/10   Partially MET  Patient to tolerate SLS on affected side for 30 seconds without loss of balance MET  Patient to improve A/PROM of left knee to WNL's with mild ERP MET  Patient to demo good heel strike and push off during gait with minimal cues from PT MET  Patient to demo improved MMT measurements to 4-/5 PARTIALLY MET  Patient able to negotiate stairs reciprocally with mild difficulty MET  Patient able to demo good hip hinge technique with minimal cues from PT NOT MET      Long Term Goals to be completed within 6 weeks  Patient able to demo good heel strike and push off without cues from PT  Patient able to perform SLS on affected LE for 30 seconds without LOB  Patient able to demo A/PROM WNL's  Patient's FOTO score to increase by 20 points  Patient able to demo correct hip hinge technique without cues from PT  Patient able to complete functional squat through 75% ROM with good form and without pain  Patient to tolerate ascending and descending steps reciprocally easily  Patient to be independent with HEP      Plan  Patient would benefit from: skilled physical therapy  Planned modality interventions: thermotherapy: hydrocollator packs, cryotherapy and unattended electrical stimulation  Planned therapy interventions: abdominal trunk stabilization, manual therapy, joint mobilization, balance, behavior modification, body mechanics training, neuromuscular re-education, patient education, postural training, therapeutic exercise, home exercise program, functional ROM exercises, flexibility, coordination, strengthening and stretching  Frequency: 2x week  Duration in visits: 12  Duration in weeks: 6  Plan of Care beginning date: 9/27/2018  Plan of Care expiration date: 11/9/2018  Treatment plan discussed with: patient        Subjective Evaluation    History of Present Illness  Date of onset: 6/23/2018  Mechanism of injury: Patient reports that she feels about 50% better at this point and feels that PT has been helping  She also admits though that her symptoms have been better also due to the fact she has not been as active lately as she usually is    She has modified a lot of her normal activities  She would like to continue PT to continue    Not a recurrent problem   Quality of life: good    Pain  No pain reported  Current pain ratin  At best pain ratin  At worst pain ratin  Location: L popliteal fossa, and peripatellar  Quality: throbbing, dull ache and discomfort  Relieving factors: rest and ice  Aggravating factors: stair climbing, walking, standing and sitting  Progression: no change (Had improved, but then got worse again recently)    Social Support  Lives with: significant other and young children    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)  Exercise comments: Was going to a gym and working out regularly, but  has decreased in the past 6 months  Stress factors comments: Has teenagers and pre-teen child  Diagnostic Tests  MRI studies: abnormal (L knee OA, cyst)        Objective     Palpation   Left   Hypertonic in the rectus femoris  Tenderness of the distal biceps femoris, distal semimembranosus, distal semitendinosus, rectus femoris, vastus lateralis and vastus medialis  Right   No palpable tenderness to the rectus femoris       Active Range of Motion   Left Knee   Flexion: 131 degrees   Prone flexion: 100 degrees   Extension: 0 degrees     Right Knee   Normal active range of motion    Passive Range of Motion   Left Knee   Flexion: 146 degrees with pain  Prone flexion: 125 degrees with pain  Extension: 5 degrees     Right Knee   Normal passive range of motion    Patellar Static Positioning   Left Knee: lateral tilt  Right Knee: lateral tilt    Strength/Myotome Testing     Left Hip   Planes of Motion   Flexion: 4  Extension: 4  Abduction: 3+  Adduction: 4-  External rotation: 3+  Internal rotation: 4-    Isolated Muscles   Gluteus liang: 3+  Gluteus medius: 3    Right Hip   Planes of Motion   Flexion: 4+  Extension: 4  Abduction: 4-  Adduction: 4  External rotation: 4-  Internal rotation: 4    Isolated Muscles   Gluteus maximums: 4  Gluteus medius: 4-    Tests     Lumbar     Left   Negative crossed SLR and passive SLR  Right   Negative crossed SLR and passive SLR  Functional Assessment   Squat   Pain, left valgus, left tibial anterior translation beyond toes, right valgus and right tibial anterior translation beyond toes  Single Leg Squat   Left Leg  Pain, positive Trendelenburg, valgus and anterior tibial translation beyond toes  Right Leg  Valgus and tibial anterior translation beyond toes       Single Leg Stance   Left: 30 seconds  Right: 30 seconds      Flowsheet Rows      Most Recent Value   PT/OT G-Codes   Current Score  44   Projected Score  58   FOTO information reviewed  Yes   Assessment Type  Re-evaluation   G code set  Mobility: Walking & Moving Around   Mobility: Walking and Moving Around Current Status ()  CK   Mobility: Walking and Moving Around Goal Status ()  CK          Precautions: Factor V clotting disorder, h/o small DVT's    Daily Treatment Diary       Manual  8/23/18 8/30/18 9/4/18 9/6/18 9/11/18 9/13/18 9/25/18 9/2718     STM  L knee ITB, peripatellar retrograde STM,  K-tape for edema control L knee L knee ITB, peripatellar retrograde STM, IASTM L ITB and vastus lateralis L knee ITB, peripatellar retrograde, L piriformis release, L recuts femoris hold-relax release L knee ITB, peripatellar retrograde, L piriformis release, L recuts femoris hold-relax release L knee ITB, peripatellar retrograde, L piriformis release, L recuts femoris hold-relax release L knee ITB, peripatellar retrograde, L piriformis release, L recuts femoris hold-relax release L knee ITB, peripatellar retrograde, L piriformis release, L recuts femoris hold-relax release     Flexibility  L LE flossing 10x L LE flossing 10x L LE flossing 10x L LE flossing 10x L LE flossing 10x L LE flossing 10x L LE flossing 10x     Joint Mobs  PF medial and inferior mobs Gr IV PF medial and inferior mobs Gr IV PF medial and inferior mobs Gr IV, MET and correction of L pelvic upslip PF medial and inferior mobs Gr IV, MET and correction of L anterior innominate PF medial and inferior mobs Gr IV, MET and correction of L anterior innominate PF medial and inferior mobs Gr IV, MET and correction of L anterior innominate PF medial and inferior mobs Gr IV, MET and correction of L anterior innominate     Kinesiotaping  Edema Control Edema control Curent tape intact                          Exercise Diary  8/23/18 8/30/18 9/4/18 9/6/18 9/11/18 9/13/18 9/25/18 9/27/18     Bridges 5"x15 5" hold x15 5" hold x20 5" hold x20 5" hold x20 5" hold x20 5" hold x20 5" hold x20     Bike Add x10  Min upright level 1 x10  Min upright level 1 NT x10  Min upright level 1 x10  Min upright level 1 x10  Min upright level 1 x10  Min upright level 1     SAQ  2x10 5" negatives 2x10 5" negatives NT NT 2x10 5" negatives NT      Prostretch  30"x4 L NT NT 30"x4 B 30"x4 B 30"x4 B      Ball squeezes with TA    5"x30 5"x30 5"x30 5"x30      Supine Clamshell iso with TA    5"X30 with Red TB 5"X30 with Red TB 5"X30 with Red TB 5"X30 with Red TB 5"X30 with Red TB     Prone Press up     To Elbows 1x15, 5" holds 1x15, 5" holds 1x15, 5" holds     Prone Quad SOS      Towel roll under L thigh 10"x10 Towel roll under L thigh 10"x10       Resisted side stepping        Red TB 3x25ft B     Step ups        1x10 4" step,  2x5 on 6" step

## 2018-10-02 ENCOUNTER — APPOINTMENT (OUTPATIENT)
Dept: PHYSICAL THERAPY | Facility: CLINIC | Age: 38
End: 2018-10-02
Payer: COMMERCIAL

## 2018-10-04 ENCOUNTER — OFFICE VISIT (OUTPATIENT)
Dept: OBGYN CLINIC | Facility: CLINIC | Age: 38
End: 2018-10-04
Payer: COMMERCIAL

## 2018-10-04 VITALS
WEIGHT: 165 LBS | BODY MASS INDEX: 29.23 KG/M2 | HEART RATE: 72 BPM | SYSTOLIC BLOOD PRESSURE: 118 MMHG | DIASTOLIC BLOOD PRESSURE: 85 MMHG | HEIGHT: 63 IN

## 2018-10-04 DIAGNOSIS — G89.29 CHRONIC PAIN OF LEFT KNEE: ICD-10-CM

## 2018-10-04 DIAGNOSIS — M17.12 PRIMARY LOCALIZED OSTEOARTHRITIS OF LEFT KNEE: Primary | ICD-10-CM

## 2018-10-04 DIAGNOSIS — M25.562 CHRONIC PAIN OF LEFT KNEE: ICD-10-CM

## 2018-10-04 PROCEDURE — 99213 OFFICE O/P EST LOW 20 MIN: CPT | Performed by: ORTHOPAEDIC SURGERY

## 2018-10-04 RX ORDER — MELOXICAM 7.5 MG/1
7.5 TABLET ORAL DAILY
Qty: 30 TABLET | Refills: 2 | Status: SHIPPED | OUTPATIENT
Start: 2018-10-04 | End: 2018-11-03

## 2018-10-04 NOTE — PROGRESS NOTES
Assessment/Plan:  1  Primary localized osteoarthritis of left knee  meloxicam (MOBIC) 7 5 mg tablet   2  Chronic pain of left knee  meloxicam (MOBIC) 7 5 mg tablet     Lyudmila Mccray is a very pleasant 34year old female with activity related left knee pain due to her underlying patellofemoral osteoarthritis  She is improving with physical therapy, and we strongly encouraged her to continue with guided exercise and her home exercise program  We encouraged her to also use the lateral J brace in instances where she will be more active or have to navigate stairs  Additionally, we provided her with a prescription for mobic to take daily with food  Hopefully these modalities will help mitigate the remainder of her pain and allow her to perform activities of daily living and enjoyment without significant limitations  She can return on an as needed basis  She understands that a cortisone injection would be the next escalation of care, but given her age and relatively healthy cartilage, we would like to avoid this if possible  All of her questions were addressed today  Subjective: Left knee follow up    Patient ID: Nimisha Pa is a 34 y o  female  Lyudmila Mccray is a very pleasant 34year old female presenting for follow up of her left knee patellofemoral osteoarthritis  She has been working with physical therapy for the past 6 weeks and has noticed relief beginning 1 5 weeks ago  She has not been using the lateral J brace often and is not taking any oral anti-inflammatories  Although she is more comfortable, this is partly attributable to her 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, such as coaching cheer, stairs, or kneeling  She is slightly frustrated that she is still limited by pain when attempting these activities  She denies any new injuries  Review of Systems   Constitutional: Negative  HENT: Negative  Eyes: Negative  Respiratory: Negative  Cardiovascular: Negative  Gastrointestinal: Negative  Endocrine: Negative  Genitourinary: Negative  Musculoskeletal: Positive for joint swelling and myalgias  Skin: Negative  Allergic/Immunologic: Negative  Neurological: Negative  Hematological: Negative  Psychiatric/Behavioral: Negative  Past Medical History:   Diagnosis Date    Allergic     Arthritis     Asthma     Factor V Leiden ([address] )        Past Surgical History:   Procedure Laterality Date     SECTION      x2    DILATION AND CURETTAGE OF UTERUS      FOOT SURGERY      x2    HERNIA REPAIR      NASAL SEPTUM SURGERY      PLANTAR FASCIA SURGERY      x2       Family History   Problem Relation Age of Onset    Factor V Leiden deficiency Mother     Heart disease Father        Social History     Occupational History    Not on file  Social History Main Topics    Smoking status: Never Smoker    Smokeless tobacco: Never Used    Alcohol use Yes      Comment: social    Drug use: No    Sexual activity: Not on file         Current Outpatient Prescriptions:     albuterol (PROVENTIL HFA,VENTOLIN HFA) 90 mcg/act inhaler, Inhale 2 puffs every 6 (six) hours as needed for wheezing, Disp: , Rfl:     fluticasone (FLONASE) 50 mcg/act nasal spray, 1 spray into each nostril daily, Disp: , Rfl:     montelukast (SINGULAIR) 10 mg tablet, Take 10 mg by mouth daily at bedtime, Disp: , Rfl:     meloxicam (MOBIC) 7 5 mg tablet, Take 1 tablet (7 5 mg total) by mouth daily for 30 days, Disp: 30 tablet, Rfl: 2    Allergies   Allergen Reactions    Other Rash     Cats, mushrooms, grasses, dust    Pt states itchy throat, watery  eyes, sneezing,     Cat Hair Extract     Dust Mite Extract     Pollen Extract        Objective:  Vitals:    10/04/18 0911   BP: 118/85   Pulse: 72       Body mass index is 29 23 kg/m²  Left Knee Exam     Tenderness   The patient is experiencing tenderness in the patella and patellar tendon (Tenderness palpation lateral patellar facet)      Range of Motion Extension: 0   Flexion: 130     Muscle Strength     The patient has normal left knee strength  Tests   Koko:  Medial - negative Lateral - negative  Lachman:  Anterior - negative      Drawer:       Anterior - negative       Varus: negative  Valgus: negative  Patellar Apprehension: negative    Other   Erythema: absent  Scars: absent  Sensation: normal  Pulse: present  Swelling: mild  Effusion: no effusion present    Comments:  Crepitance on AROM and PROM-parapatellar  +PFG  Collateral ligaments stable at 0, 30, 90 degrees  Ambulates with a normal, symmetrical gait  Grossly NVI          Observations   Left Knee   Negative for effusion  Physical Exam   Constitutional: She is oriented to person, place, and time  She appears well-developed and well-nourished  Body mass index is 29 23 kg/m²  HENT:   Head: Normocephalic and atraumatic  Eyes: EOM are normal    Neck: Normal range of motion  Cardiovascular: Intact distal pulses  Pulmonary/Chest: Effort normal    Musculoskeletal:        Left knee: She exhibits no effusion  See ortho exam   Neurological: She is alert and oriented to person, place, and time  Skin: Skin is warm and dry  Psychiatric: She has a normal mood and affect   Her behavior is normal  Judgment and thought content normal

## 2018-10-09 ENCOUNTER — OFFICE VISIT (OUTPATIENT)
Dept: PHYSICAL THERAPY | Facility: CLINIC | Age: 38
End: 2018-10-09
Payer: COMMERCIAL

## 2018-10-09 DIAGNOSIS — M17.12 PRIMARY LOCALIZED OSTEOARTHRITIS OF LEFT KNEE: ICD-10-CM

## 2018-10-09 DIAGNOSIS — G89.29 CHRONIC PAIN OF LEFT KNEE: ICD-10-CM

## 2018-10-09 DIAGNOSIS — M22.2X2 PATELLOFEMORAL SYNDROME OF LEFT KNEE: Primary | ICD-10-CM

## 2018-10-09 DIAGNOSIS — M25.562 CHRONIC PAIN OF LEFT KNEE: ICD-10-CM

## 2018-10-09 PROCEDURE — 97110 THERAPEUTIC EXERCISES: CPT

## 2018-10-09 PROCEDURE — 97140 MANUAL THERAPY 1/> REGIONS: CPT

## 2018-10-09 PROCEDURE — 97112 NEUROMUSCULAR REEDUCATION: CPT

## 2018-10-09 NOTE — PROGRESS NOTES
Daily Note     Today's date: 10/9/2018  Patient name: Maridee Hamman  : 1980  MRN: [id number]  Referring provider: Sophie Cheema  Dx:   Encounter Diagnoses   Name Primary? 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 , and had to run across the field  Since then has had 3/10 pain with stair negotiation    Pain Rating: 3/10    Objective: See treatment diary below  Precautions: Factor V clotting disorder, h/o small DVT's    Daily Treatment Diary       Manual  8/23/18 8/30/18 9/4/18 9/6/18 9/11/18 9/13/18 9/25/18 2718 10/9/18    STM  L knee ITB, peripatellar retrograde STM,  K-tape for edema control L knee L knee ITB, peripatellar retrograde STM, IASTM L ITB and vastus lateralis L knee ITB, peripatellar retrograde, L piriformis release, L recuts femoris hold-relax release L knee ITB, peripatellar retrograde, L piriformis release, L recuts femoris hold-relax release L knee ITB, peripatellar retrograde, L piriformis release, L recuts femoris hold-relax release L knee ITB, peripatellar retrograde, L piriformis release, L recuts femoris hold-relax release L knee ITB, peripatellar retrograde, L piriformis release, L recuts femoris hold-relax release L knee ITB, peripatellar retrograde, L piriformis release, L recuts femoris hold-relax release    Flexibility  L LE flossing 10x L LE flossing 10x L LE flossing 10x L LE flossing 10x L LE flossing 10x L LE flossing 10x L LE flossing 10x L LE flossing 10x    Joint Mobs  PF medial and inferior mobs Gr IV PF medial and inferior mobs Gr IV PF medial and inferior mobs Gr IV, MET and correction of L pelvic upslip PF medial and inferior mobs Gr IV, MET and correction of L anterior innominate PF medial and inferior mobs Gr IV, MET and correction of L anterior innominate PF medial and inferior mobs Gr IV, MET and correction of L anterior innominate PF medial and inferior mobs Gr IV, MET and correction of L anterior innominate PF medial and inferior mobs Gr IV, MET and correction of L ant innominate    Kinesiotaping  Edema Control Edema control Curent tape intact                          Exercise Diary  8/23/18 8/30/18 9/4/18 9/6/18 9/11/18 9/13/18 9/25/18 9/27/18 10/9/18    Bridges 5"x15 5" hold x15 5" hold x20 5" hold x20 5" hold x20 5" hold x20 5" hold x20 5" hold x20 5" hold x20    Bike Add x10  Min upright level 1 x10  Min upright level 1 NT x10  Min upright level 1 x10  Min upright level 1 x10  Min upright level 1 x10  Min upright level 1 x10  Min upright level 1    SAQ  2x10 5" negatives 2x10 5" negatives NT NT 2x10 5" negatives NT      Prostretch  30"x4 L NT NT 30"x4 B 30"x4 B 30"x4 B  30" x4 B    Ball squeezes with TA    5"x30 5"x30 5"x30 5"x30  15x5"/feet elevated 15x5"    Supine Clamshell iso with TA    5"X30 with Red TB 5"X30 with Red TB 5"X30 with Red TB 5"X30 with Red TB 5"X30 with Red TB s/l 5"X30 with Red TB    Prone Press up     To Elbows 1x15, 5" holds 1x15, 5" holds 1x15, 5" holds 1x15, 5" holds    Prone Quad SOS      Towel roll under L thigh 10"x10 Towel roll under L thigh 10"x10   SLR abd x10 terrence    Resisted side stepping        Red TB 3x25ft B Red TB 3x25ft B    Step ups        1x10 4" step,  2x5 on 6" step 1x10 4" step,  2x5 on 6" step    Wall squats         w/ band abd 2x10 red                                                                                                                             Assessment: Pt demonstrates marked weakness terrence L>R glute med  Slight patellar pain reported with wall squats, pain is abolished with band abd  Also requires band abd with step ups secondary to increased femoral IR and valgus collapse         Plan: Continue per plan of care  Progress treatment as tolerated

## 2018-10-11 ENCOUNTER — APPOINTMENT (OUTPATIENT)
Dept: PHYSICAL THERAPY | Facility: CLINIC | Age: 38
End: 2018-10-11
Payer: COMMERCIAL

## 2018-10-16 ENCOUNTER — OFFICE VISIT (OUTPATIENT)
Dept: PHYSICAL THERAPY | Facility: CLINIC | Age: 38
End: 2018-10-16
Payer: COMMERCIAL

## 2018-10-16 DIAGNOSIS — M22.2X2 PATELLOFEMORAL SYNDROME OF LEFT KNEE: Primary | ICD-10-CM

## 2018-10-16 DIAGNOSIS — M25.562 CHRONIC PAIN OF LEFT KNEE: ICD-10-CM

## 2018-10-16 DIAGNOSIS — G89.29 CHRONIC PAIN OF LEFT KNEE: ICD-10-CM

## 2018-10-16 DIAGNOSIS — M17.12 PRIMARY LOCALIZED OSTEOARTHRITIS OF LEFT KNEE: ICD-10-CM

## 2018-10-16 PROCEDURE — 97112 NEUROMUSCULAR REEDUCATION: CPT

## 2018-10-16 PROCEDURE — G8978 MOBILITY CURRENT STATUS: HCPCS

## 2018-10-16 PROCEDURE — 97140 MANUAL THERAPY 1/> REGIONS: CPT

## 2018-10-16 PROCEDURE — G8979 MOBILITY GOAL STATUS: HCPCS

## 2018-10-16 PROCEDURE — 97110 THERAPEUTIC EXERCISES: CPT

## 2018-10-16 NOTE — PROGRESS NOTES
Daily Note     Today's date: 10/16/2018  Patient name: Shae García  : 1980  MRN: [id number]  Referring provider: Ck Cohen  Dx:   Encounter Diagnosis     ICD-10-CM    1  Patellofemoral syndrome of left knee M22 2X2    2  Primary localized osteoarthritis of left knee M17 12    3  Chronic pain of left knee M25 562     G89 29                 Subjective: Patient reports that she has been on anti-inflammatory medication (Meloxicam) since last week and it has decreased her L knee pain to a mild discomfort  On arrival, she has no complaint of L knee pain  She continues to do prone press ups to manage her lower back discomfort         Objective: See treatment diary below  Precautions: Factor V clotting disorder, h/o small DVT's    Daily Treatment Diary       Manual  8/23/18 8/30/18 9/4/18 9/6/18 9/11/18 9/13/18 9/25/18 2718 10/9/18 10/   STM  L knee ITB, peripatellar retrograde STM,  K-tape for edema control L knee L knee ITB, peripatellar retrograde STM, IASTM L ITB and vastus lateralis L knee ITB, peripatellar retrograde, L piriformis release, L recuts femoris hold-relax release L knee ITB, peripatellar retrograde, L piriformis release, L recuts femoris hold-relax release L knee ITB, peripatellar retrograde, L piriformis release, L recuts femoris hold-relax release L knee ITB, peripatellar retrograde, L piriformis release, L recuts femoris hold-relax release L knee ITB, peripatellar retrograde, L piriformis release, L recuts femoris hold-relax release L knee ITB, peripatellar retrograde, L piriformis release, L recuts femoris hold-relax release    Flexibility  L LE flossing 10x L LE flossing 10x L LE flossing 10x L LE flossing 10x L LE flossing 10x L LE flossing 10x L LE flossing 10x L LE flossing 10x    Joint Mobs  PF medial and inferior mobs Gr IV PF medial and inferior mobs Gr IV PF medial and inferior mobs Gr IV, MET and correction of L pelvic upslip PF medial and inferior mobs Gr IV, MET and correction of L anterior innominate PF medial and inferior mobs Gr IV, MET and correction of L anterior innominate PF medial and inferior mobs Gr IV, MET and correction of L anterior innominate PF medial and inferior mobs Gr IV, MET and correction of L anterior innominate PF medial and inferior mobs Gr IV, MET and correction of L ant innominate    Kinesiotaping  Edema Control Edema control Curent tape intact                          Exercise Diary  8/23/18 8/30/18 9/4/18 9/6/18 9/11/18 9/13/18 9/25/18 9/27/18 10/9/18 10/16/18   Bridges 5"x15 5" hold x15 5" hold x20 5" hold x20 5" hold x20 5" hold x20 5" hold x20 5" hold x20 5" hold x20 5" hold x20   Bike Add x10  Min upright level 1 x10  Min upright level 1 NT x10  Min upright level 1 x10  Min upright level 1 x10  Min upright level 1 x10  Min upright level 1 x10  Min upright level 1 x10  Min upright level 1   SAQ  2x10 5" negatives 2x10 5" negatives NT NT 2x10 5" negatives NT      Prostretch  30"x4 L NT NT 30"x4 B 30"x4 B 30"x4 B  30" x4 B 30" x4 B   Ball squeezes with TA    5"x30 5"x30 5"x30 5"x30  15x5"/feet elevated 15x5" 20x5"/feet elevated 15x5"   Supine Clamshell iso with TA    5"X30 with Red TB 5"X30 with Red TB 5"X30 with Red TB 5"X30 with Red TB 5"X30 with Red TB s/l 5"X30 with Red TB s/l 5"X30 with Red TB   Prone Press up     To Elbows 1x15, 5" holds 1x15, 5" holds 1x15, 5" holds 1x15, 5" holds 1x15, 5" holds   Prone Quad SOS      Towel roll under L thigh 10"x10 Towel roll under L thigh 10"x10   SLR abd x10 terrence    Resisted side stepping        Red TB 3x25ft B Red TB 3x25ft B Red TB 3x25ft B   Step ups        1x10 4" step,  2x5 on 6" step 1x10 4" step,  2x5 on 6" step 1x10 4" step,  2x5 on 6" step   Wall squats         w/ band abd 2x10 red w/ band abd 2x10 red                                                                                                                              Assessment: Tolerated treatment well and without distress   Patient demonstrated fatigue post treatment, exhibited good technique with therapeutic exercises and would benefit from continued PT in this setting to increase strength of L hip to improve PF tracking and mechanics during functional movement patterns  Plan: Continue per plan of care

## 2018-10-18 ENCOUNTER — APPOINTMENT (OUTPATIENT)
Dept: PHYSICAL THERAPY | Facility: CLINIC | Age: 38
End: 2018-10-18
Payer: COMMERCIAL

## 2018-10-23 ENCOUNTER — OFFICE VISIT (OUTPATIENT)
Dept: PHYSICAL THERAPY | Facility: CLINIC | Age: 38
End: 2018-10-23
Payer: COMMERCIAL

## 2018-10-23 DIAGNOSIS — M25.562 CHRONIC PAIN OF LEFT KNEE: ICD-10-CM

## 2018-10-23 DIAGNOSIS — M22.2X2 PATELLOFEMORAL SYNDROME OF LEFT KNEE: Primary | ICD-10-CM

## 2018-10-23 DIAGNOSIS — M17.12 PRIMARY LOCALIZED OSTEOARTHRITIS OF LEFT KNEE: ICD-10-CM

## 2018-10-23 DIAGNOSIS — G89.29 CHRONIC PAIN OF LEFT KNEE: ICD-10-CM

## 2018-10-23 PROCEDURE — 97110 THERAPEUTIC EXERCISES: CPT

## 2018-10-23 PROCEDURE — 97140 MANUAL THERAPY 1/> REGIONS: CPT

## 2018-10-23 NOTE — PROGRESS NOTES
Daily Note     Today's date: 10/23/2018  Patient name: Elaine Doe  : 1980  MRN: [id number]  Referring provider: Chika Mcnulty  Dx:   Encounter Diagnosis     ICD-10-CM    1  Patellofemoral syndrome of left knee M22 2X2    2  Primary localized osteoarthritis of left knee M17 12    3  Chronic pain of left knee M25 562     G89 [id number]                   Subjective: Patient reports that she forgot to take her anti-inflammatory medication for the 3 days over the weekend and her pain in the L knee did not flare back up  She did go back on the medication yesterday to complete her prescription         Objective: See treatment diary below  Precautions: Factor V clotting disorder, h/o small DVT's    Daily Treatment Diary       Manual  10/23         10/16   STM L knee ITB, peripatellar retrograde, L piriformis release, L recuts femoris hold-relax release            Flexibility L LE flossing 10x L LE flossing 10x L LE flossing 10x L LE flossing 10x L LE flossing 10x L LE flossing 10x L LE flossing 10x L LE flossing 10x L LE flossing 10x    Joint Mobs PF medial and inferior mobs Gr IV PF medial and inferior mobs Gr IV PF medial and inferior mobs Gr IV PF medial and inferior mobs Gr IV, MET and correction of L pelvic upslip PF medial and inferior mobs Gr IV, MET and correction of L anterior innominate PF medial and inferior mobs Gr IV, MET and correction of L anterior innominate PF medial and inferior mobs Gr IV, MET and correction of L anterior innominate PF medial and inferior mobs Gr IV, MET and correction of L anterior innominate PF medial and inferior mobs Gr IV, MET and correction of L ant innominate    Kinesiotaping  Edema Control Edema control Curent tape intact                          Exercise Diary  10/23            Bridges 5"x15            Bike x10  Min upright level 1            SAQ             Prostretch 30"x4 B            Ball squeezes with TA             Supine Clamshell iso with TA Prone Press up             Prone Quad SOS              Resisted side stepping Red TB 3x25ft B            Step ups 1x10 6" step,  2x5 on 6" step            Wall squats w/ band abd 2x10 red            SLS With ball toss, on foam 3x20                                                                                                                            Assessment: Tolerated treatment well  Patient demonstrated fatigue post treatment, exhibited good technique with therapeutic exercises and would benefit from continued PT  In this setting to increase her hip and LE strength, improve her ability to perform closed chain activity without discomfort  Plan: Continue per plan of care

## 2018-10-25 ENCOUNTER — OFFICE VISIT (OUTPATIENT)
Dept: PHYSICAL THERAPY | Facility: CLINIC | Age: 38
End: 2018-10-25
Payer: COMMERCIAL

## 2018-10-25 DIAGNOSIS — M25.562 CHRONIC PAIN OF LEFT KNEE: ICD-10-CM

## 2018-10-25 DIAGNOSIS — M22.2X2 PATELLOFEMORAL SYNDROME OF LEFT KNEE: Primary | ICD-10-CM

## 2018-10-25 DIAGNOSIS — M17.12 PRIMARY LOCALIZED OSTEOARTHRITIS OF LEFT KNEE: ICD-10-CM

## 2018-10-25 DIAGNOSIS — G89.29 CHRONIC PAIN OF LEFT KNEE: ICD-10-CM

## 2018-10-25 PROCEDURE — 97140 MANUAL THERAPY 1/> REGIONS: CPT

## 2018-10-25 PROCEDURE — 97110 THERAPEUTIC EXERCISES: CPT

## 2018-10-25 NOTE — PROGRESS NOTES
Daily Note     Today's date: 10/25/2018  Patient name: Lamont Molina  : 1980  MRN: [id number]  Referring provider: Yan Rodríguez  Dx:   Encounter Diagnosis     ICD-10-CM    1  Patellofemoral syndrome of left knee M22 2X2    2  Primary localized osteoarthritis of left knee M17 12    3  Chronic pain of left knee M25 562     G89 29                   Subjective: Patient reports that she is continuing the anti inflammatory medication at this time, per her MD orders  She feels that she may be ready for D/C with HEP in another 2 weeks or so to try doing things on her own for a time  Objective: See treatment diary below  Precautions: Factor V clotting disorder, h/o small DVT's    Daily Treatment Diary       Manual  10/23 10/25        10/16   STM L knee ITB, peripatellar retrograde, L piriformis release, L recuts femoris hold-relax release L knee ITB, peripatellar retrograde, L piriformis release, L recuts femoris hold-relax release           Flexibility L LE flossing 10x L LE flossing 10x           Joint Mobs PF medial and inferior mobs Gr IV PF medial and inferior mobs Gr IV           Kinesiotaping  Edema Control                            Exercise Diary  10/23 10/25           Bridges 5"x15            Bike x10  Min upright level 1 x10  Min upright level 1           SAQ             Prostretch 30"x4 B 30"x4 B           Ball squeezes with TA             Supine Clamshell iso with TA             Prone Press up             Prone Quad SOS              Resisted side stepping Red TB 3x25ft B Red TB 3x25ft B           Step ups 1x10 6" step,  2x5 on 6" step 1x10 6" step,  2x5 on 6" step           Wall squats w/ band abd 2x10 red w/ band abd 2x10 red           SLS With ball toss, on foam 3x20 With ball toss, on foam 3x20                                                                                                                         Assessment: Tolerated treatment well and without distress    She struggles with performing hip hinge pattern and requires verbal and tactile cues  Patient demonstrated fatigue post treatment, exhibited good technique with therapeutic exercises and would benefit from continued PT in this setting for 2 more sessions to establish HEP  If symptoms still well controlled and patient independent with performance, will plan for D/C at that time  Plan: Continue per plan of care  12/20/18:  Patient never returned calls to schedule and she had last reported that her schedule with kids and her own doctor's appointments were very busy at the time  She had plans to continue with her HEP if all was going well  She is now discharged from PT services

## 2018-10-30 ENCOUNTER — APPOINTMENT (OUTPATIENT)
Dept: PHYSICAL THERAPY | Facility: CLINIC | Age: 38
End: 2018-10-30
Payer: COMMERCIAL